# Patient Record
Sex: MALE | Race: WHITE | Employment: PART TIME | URBAN - METROPOLITAN AREA
[De-identification: names, ages, dates, MRNs, and addresses within clinical notes are randomized per-mention and may not be internally consistent; named-entity substitution may affect disease eponyms.]

---

## 2021-06-15 ENCOUNTER — OFFICE VISIT (OUTPATIENT)
Dept: URGENT CARE | Facility: URGENT CARE | Age: 66
End: 2021-06-15
Payer: COMMERCIAL

## 2021-06-15 VITALS
OXYGEN SATURATION: 97 % | DIASTOLIC BLOOD PRESSURE: 88 MMHG | HEART RATE: 61 BPM | TEMPERATURE: 98.2 F | SYSTOLIC BLOOD PRESSURE: 144 MMHG | WEIGHT: 180 LBS

## 2021-06-15 DIAGNOSIS — E78.5 HYPERLIPIDEMIA LDL GOAL <100: ICD-10-CM

## 2021-06-15 DIAGNOSIS — I10 HYPERTENSION, UNSPECIFIED TYPE: Primary | ICD-10-CM

## 2021-06-15 PROCEDURE — 99203 OFFICE O/P NEW LOW 30 MIN: CPT | Performed by: FAMILY MEDICINE

## 2021-06-15 RX ORDER — NEBIVOLOL 10 MG/1
10 TABLET ORAL
COMMUNITY

## 2021-06-15 RX ORDER — ROSUVASTATIN CALCIUM 5 MG/1
5 TABLET, COATED ORAL
COMMUNITY

## 2021-06-15 RX ORDER — ROSUVASTATIN CALCIUM 5 MG/1
5 TABLET, COATED ORAL DAILY
Qty: 7 TABLET | Refills: 0 | Status: SHIPPED | OUTPATIENT
Start: 2021-06-15 | End: 2021-06-22

## 2021-06-15 RX ORDER — NEBIVOLOL 10 MG/1
10 TABLET ORAL DAILY
Qty: 7 TABLET | Refills: 0 | Status: SHIPPED | OUTPATIENT
Start: 2021-06-15 | End: 2021-06-22

## 2021-06-15 NOTE — PROGRESS NOTES
SUBJECTIVE:  Chief Complaint   Patient presents with     Urgent Care     Hypertension     c/o high BP     New patient to [facility]    Gonzalez Prince is a 65 year old male who presents with a chief complaint of elevated BP, has HTN.    Patient usually very active, here in [place] to care for his mother so has not been able to walk and exercise regularly.  In addition, foods is more salty and fried which is not what he normally eats.  Has to be here for possible 1 more week and will run out of his medications.    Mother was getting her BP check so he decided to check his too, got nervous as his BP was elevated 165/101.  Denies any headache, chest pain or SOB.    BP usually very stable with current Bystolic 10 mg dose.  Has regular follow up with primary provider.    No past medical history on file.  Current Outpatient Medications   Medication Sig Dispense Refill     nebivolol (BYSTOLIC) 10 MG tablet Take 10 mg by mouth       rosuvastatin (CRESTOR) 5 MG tablet Take 5 mg by mouth       Social History     Tobacco Use     Smoking status: Never Smoker     Smokeless tobacco: Never Used   Substance Use Topics     Alcohol use: Not on file       ROS:  Review of systems negative except as stated above.    EXAM:   BP (!) 149/82   Pulse 63   Temp 98.2  F (36.8  C) (Tympanic)   Wt 81.6 kg (180 lb)   SpO2 97%   GENERAL APPEARANCE: healthy, alert and no distress  CHEST: no audible wheezes or increase work of breathing  PSYCH: alert, affect bright      ASSESSMENT/PLAN:  (I10) Hypertension, unspecified type  (primary encounter diagnosis)  Comment: mild elevated  Plan: nebivolol (BYSTOLIC) 10 MG tablet            (E78.5) Hyperlipidemia LDL goal <100  Plan: rosuvastatin (CRESTOR) 5 MG tablet            Reassurance given, reviewed BP and noted that is mildly elevated above goal.  Discussed that most likely due to current diet and lack of exercise and encourage to find time to incorporate that back.  Discussed indication for HTN urgency  management and this is usually thru ER and not Urgent Care.  Recommend to monitor BP at this time and to contact primary provider with logs in case primary desires to make adjustment.  RX bystolic and RX crestor given for 1 week to prevent patient from running out of medications.    Follow up with primary provider for HTN management.    Octaviano Melvin MD  Eleanor 15, 2021 7:52 PM